# Patient Record
Sex: MALE | Race: WHITE | Employment: UNEMPLOYED | URBAN - METROPOLITAN AREA
[De-identification: names, ages, dates, MRNs, and addresses within clinical notes are randomized per-mention and may not be internally consistent; named-entity substitution may affect disease eponyms.]

---

## 2018-01-06 ENCOUNTER — HOSPITAL ENCOUNTER (EMERGENCY)
Age: 38
Discharge: HOME | End: 2018-01-06
Attending: EMERGENCY MEDICINE
Payer: COMMERCIAL

## 2018-01-06 VITALS
TEMPERATURE: 97.9 F | HEIGHT: 69 IN | OXYGEN SATURATION: 94 % | SYSTOLIC BLOOD PRESSURE: 140 MMHG | DIASTOLIC BLOOD PRESSURE: 84 MMHG | WEIGHT: 218.38 LBS | HEART RATE: 111 BPM | RESPIRATION RATE: 18 BRPM | BODY MASS INDEX: 32.35 KG/M2

## 2018-01-06 DIAGNOSIS — R11.2 NON-INTRACTABLE VOMITING WITH NAUSEA, UNSPECIFIED VOMITING TYPE: Primary | ICD-10-CM

## 2018-01-06 LAB
ABSOLUTE EOS #: 0.1 K/UL (ref 0–0.4)
ABSOLUTE IMMATURE GRANULOCYTE: ABNORMAL K/UL (ref 0–0.3)
ABSOLUTE LYMPH #: 2.4 K/UL (ref 1–4.8)
ABSOLUTE MONO #: 1.1 K/UL (ref 0.2–0.8)
ALBUMIN SERPL-MCNC: 4.4 G/DL (ref 3.5–5.2)
ALBUMIN/GLOBULIN RATIO: NORMAL (ref 1–2.5)
ALP BLD-CCNC: 72 U/L (ref 40–129)
ALT SERPL-CCNC: 17 U/L (ref 5–41)
AMYLASE: 35 U/L (ref 28–100)
ANION GAP SERPL CALCULATED.3IONS-SCNC: 15 MMOL/L (ref 9–17)
AST SERPL-CCNC: 16 U/L
BASOPHILS # BLD: 1 % (ref 0–2)
BASOPHILS ABSOLUTE: 0.1 K/UL (ref 0–0.2)
BILIRUB SERPL-MCNC: 0.48 MG/DL (ref 0.3–1.2)
BILIRUBIN DIRECT: 0.11 MG/DL
BILIRUBIN, INDIRECT: 0.37 MG/DL (ref 0–1)
BUN BLDV-MCNC: 9 MG/DL (ref 6–20)
BUN/CREAT BLD: 13 (ref 9–20)
CALCIUM SERPL-MCNC: 9.5 MG/DL (ref 8.6–10.4)
CHLORIDE BLD-SCNC: 97 MMOL/L (ref 98–107)
CO2: 25 MMOL/L (ref 20–31)
CREAT SERPL-MCNC: 0.67 MG/DL (ref 0.7–1.2)
DIFFERENTIAL TYPE: ABNORMAL
EOSINOPHILS RELATIVE PERCENT: 1 % (ref 1–4)
GFR AFRICAN AMERICAN: >60 ML/MIN
GFR NON-AFRICAN AMERICAN: >60 ML/MIN
GFR SERPL CREATININE-BSD FRML MDRD: ABNORMAL ML/MIN/{1.73_M2}
GFR SERPL CREATININE-BSD FRML MDRD: ABNORMAL ML/MIN/{1.73_M2}
GLOBULIN: NORMAL G/DL (ref 1.5–3.8)
GLUCOSE BLD-MCNC: 130 MG/DL (ref 70–99)
HCT VFR BLD CALC: 43.7 % (ref 41–53)
HEMOGLOBIN: 14.8 G/DL (ref 13.5–17.5)
IMMATURE GRANULOCYTES: ABNORMAL %
LIPASE: 14 U/L (ref 13–60)
LYMPHOCYTES # BLD: 20 % (ref 24–44)
MCH RBC QN AUTO: 28.7 PG (ref 26–34)
MCHC RBC AUTO-ENTMCNC: 34 G/DL (ref 31–37)
MCV RBC AUTO: 84.5 FL (ref 80–100)
MONOCYTES # BLD: 9 % (ref 1–7)
PDW BLD-RTO: 12.9 % (ref 11.5–14.5)
PLATELET # BLD: 332 K/UL (ref 130–400)
PLATELET ESTIMATE: ABNORMAL
PMV BLD AUTO: 7 FL (ref 6–12)
POTASSIUM SERPL-SCNC: 3.8 MMOL/L (ref 3.7–5.3)
RBC # BLD: 5.17 M/UL (ref 4.5–5.9)
RBC # BLD: ABNORMAL 10*6/UL
SEG NEUTROPHILS: 69 % (ref 36–66)
SEGMENTED NEUTROPHILS ABSOLUTE COUNT: 8.4 K/UL (ref 1.8–7.7)
SODIUM BLD-SCNC: 137 MMOL/L (ref 135–144)
TOTAL PROTEIN: 7.7 G/DL (ref 6.4–8.3)
WBC # BLD: 12.2 K/UL (ref 3.5–11)
WBC # BLD: ABNORMAL 10*3/UL

## 2018-01-06 PROCEDURE — 80076 HEPATIC FUNCTION PANEL: CPT

## 2018-01-06 PROCEDURE — 6360000002 HC RX W HCPCS: Performed by: EMERGENCY MEDICINE

## 2018-01-06 PROCEDURE — 82150 ASSAY OF AMYLASE: CPT

## 2018-01-06 PROCEDURE — 80048 BASIC METABOLIC PNL TOTAL CA: CPT

## 2018-01-06 PROCEDURE — 96374 THER/PROPH/DIAG INJ IV PUSH: CPT

## 2018-01-06 PROCEDURE — 85025 COMPLETE CBC W/AUTO DIFF WBC: CPT

## 2018-01-06 PROCEDURE — 81003 URINALYSIS AUTO W/O SCOPE: CPT

## 2018-01-06 PROCEDURE — 99283 EMERGENCY DEPT VISIT LOW MDM: CPT

## 2018-01-06 PROCEDURE — 83690 ASSAY OF LIPASE: CPT

## 2018-01-06 PROCEDURE — 2580000003 HC RX 258: Performed by: EMERGENCY MEDICINE

## 2018-01-06 RX ORDER — TRAZODONE HYDROCHLORIDE 100 MG/1
TABLET ORAL
Refills: 0 | COMMUNITY
Start: 2018-01-03

## 2018-01-06 RX ORDER — ONDANSETRON 4 MG/1
4 TABLET, ORALLY DISINTEGRATING ORAL EVERY 6 HOURS PRN
Qty: 12 TABLET | Refills: 0 | Status: SHIPPED | OUTPATIENT
Start: 2018-01-06

## 2018-01-06 RX ORDER — ATOMOXETINE 40 MG/1
CAPSULE ORAL
Refills: 0 | COMMUNITY
Start: 2018-01-03

## 2018-01-06 RX ORDER — BUPRENORPHINE HYDROCHLORIDE, NALOXONE HYDROCHLORIDE 12; 3 MG/1; MG/1
FILM, SOLUBLE BUCCAL; SUBLINGUAL
COMMUNITY
Start: 2018-01-04

## 2018-01-06 RX ORDER — BUSPIRONE HYDROCHLORIDE 30 MG/1
TABLET ORAL
Refills: 0 | COMMUNITY
Start: 2018-01-03

## 2018-01-06 RX ORDER — ONDANSETRON 2 MG/ML
4 INJECTION INTRAMUSCULAR; INTRAVENOUS ONCE
Status: COMPLETED | OUTPATIENT
Start: 2018-01-06 | End: 2018-01-06

## 2018-01-06 RX ORDER — 0.9 % SODIUM CHLORIDE 0.9 %
1000 INTRAVENOUS SOLUTION INTRAVENOUS ONCE
Status: COMPLETED | OUTPATIENT
Start: 2018-01-06 | End: 2018-01-06

## 2018-01-06 RX ADMIN — ONDANSETRON 4 MG: 2 INJECTION INTRAMUSCULAR; INTRAVENOUS at 08:46

## 2018-01-06 RX ADMIN — SODIUM CHLORIDE 1000 ML: 9 INJECTION, SOLUTION INTRAVENOUS at 08:46

## 2018-01-06 ASSESSMENT — ENCOUNTER SYMPTOMS
COLOR CHANGE: 0
COUGH: 0
EYE DISCHARGE: 0
CONSTIPATION: 0
NAUSEA: 1
EYE REDNESS: 0
VOMITING: 1
ABDOMINAL PAIN: 1
FACIAL SWELLING: 0
SHORTNESS OF BREATH: 0
DIARRHEA: 0

## 2018-01-06 ASSESSMENT — PAIN DESCRIPTION - LOCATION: LOCATION: ABDOMEN

## 2018-01-06 ASSESSMENT — PAIN SCALES - GENERAL: PAINLEVEL_OUTOF10: 6

## 2018-01-06 NOTE — ED NOTES
Patient comes in from home and presents with nausea for one day and vomiting starting 1/5/18. Patient state that he started having nausea three days ago and yesterday was having intermittent vomiting and unable to hold any fluids or food down. Patient is accompanied by mother and is alert and oriented x4. Patient states that he is a recovering IV drug user and has been on a suboxone program for one month.        Vicki Palacio RN  01/06/18 [id number]

## 2018-01-06 NOTE — ED PROVIDER NOTES
[address] [place] [address] ED  eMERGENCY dEPARTMENT eNCOUnter      Pt Name: Nirav Damian  MRN: [id number]  [place] 1980  Date of evaluation: 2018  Provider: Jorge Daily MD    CHIEF COMPLAINT       Chief Complaint   Patient presents with    Abdominal Pain         HISTORY OF PRESENT ILLNESS  (Location/Symptom, Timing/Onset, Context/Setting, Quality, Duration, Modifying Factors, Severity.)   Nirav Damian is a 30 y.o. male who presents to the emergency department For abdominal pain. It started at 9 or 10:00 last night and it's in the right upper quadrant. He has been vomiting and states during the day yesterday he couldn't keep anything down. No fever or trauma but he states he has a history of hepatitis C. He rated the pain as a 6 and it's continuous. No cough or shortness breath. Nursing Notes were reviewed. ALLERGIES     Food; Suxamethonium [succinylcholine]; Molds & smuts; Other; Pollen extract; and Penicillins    CURRENT MEDICATIONS       Previous Medications    ATOMOXETINE (STRATTERA) 40 MG CAPSULE    take 1 capsule by mouth every morning    BUSPIRONE (BUSPAR) 30 MG TABLET    take 1 tablet by mouth three times a day    SUBOXONE 12-3 MG SUBLINGUAL FILM        TRAZODONE (DESYREL) 100 MG TABLET    take 2 tablets by mouth at bedtime       PAST MEDICAL HISTORY         Diagnosis Date    Asthma     as child    Fracture, metacarpal 5/30/15    left     Hand pain, left     Hepatitis C     Heroin abuse     IV    Left eye injury     \"cracked eye socket\"    Opiate addiction ([address]        SURGICAL HISTORY           Procedure Laterality Date    HAND CLOSED REDUCTION Left 2015    perc. pinning.  5th metacarpal    TONSILLECTOMY      TYMPANOSTOMY TUBE PLACEMENT Bilateral     x3         FAMILY HISTORY           Problem Relation Age of Onset    Kidney Disease Sister     Diabetes Brother      Family Status   Relation Status    Mother Alive    Father     Sister Alive    Brother Alive        SOCIAL HISTORY      reports that he has been smoking Cigarettes. He has a 20.00 pack-year smoking history. He has never used smokeless tobacco. He reports that he drinks alcohol. He reports that he uses drugs, including Marijuana, IV, and Opiates . REVIEW OF SYSTEMS    (2-9 systems for level 4, 10 or more for level 5)     Review of Systems   Constitutional: Negative for chills, fatigue and fever. HENT: Negative for congestion, ear discharge and facial swelling. Eyes: Negative for discharge and redness. Respiratory: Negative for cough and shortness of breath. Cardiovascular: Negative for chest pain. Gastrointestinal: Positive for abdominal pain, nausea and vomiting. Negative for constipation and diarrhea. Genitourinary: Negative for dysuria and hematuria. Musculoskeletal: Negative for arthralgias. Skin: Negative for color change and rash. Neurological: Negative for syncope, numbness and headaches. Hematological: Negative for adenopathy. Psychiatric/Behavioral: Negative for confusion. The patient is not nervous/anxious. Except as noted above the remainder of the review of systems was reviewed and negative. PHYSICAL EXAM    (up to 7 for level 4, 8 or more for level 5)     Vitals:    01/06/18 0759   BP: (!) 140/84   Pulse: 111   Resp: 18   Temp: 97.9 °F (36.6 °C)   SpO2: 94%   Weight: 218 lb 6 oz (99.1 kg)   Height: 5' 9\" (1.753 m)       Physical Exam   Constitutional: He is oriented to person, place, and time. He appears well-developed and well-nourished. No distress. HENT:   Head: Normocephalic and atraumatic. Eyes: Right eye exhibits no discharge. Left eye exhibits no discharge. No scleral icterus. Neck: Neck supple. Cardiovascular: Normal rate and regular rhythm. Pulmonary/Chest: Effort normal and breath sounds normal. No stridor. No respiratory distress. He has no wheezes. He has no rales. Abdominal: Soft. He exhibits no distension.  There is

## 2018-02-26 ENCOUNTER — HOSPITAL ENCOUNTER (OUTPATIENT)
Age: 38
Setting detail: SPECIMEN
Discharge: HOME | End: 2018-02-26
Payer: COMMERCIAL

## 2018-02-26 LAB
ABSOLUTE EOS #: 0.1 K/UL (ref 0–0.44)
ABSOLUTE IMMATURE GRANULOCYTE: 0.03 K/UL (ref 0–0.3)
ABSOLUTE LYMPH #: 3.13 K/UL (ref 1.1–3.7)
ABSOLUTE MONO #: 0.59 K/UL (ref 0.1–1.2)
ALBUMIN SERPL-MCNC: 3.9 G/DL (ref 3.5–5.2)
ALBUMIN/GLOBULIN RATIO: 1.3 (ref 1–2.5)
ALP BLD-CCNC: 69 U/L (ref 40–129)
ALT SERPL-CCNC: 29 U/L (ref 5–41)
ANION GAP SERPL CALCULATED.3IONS-SCNC: 18 MMOL/L (ref 9–17)
AST SERPL-CCNC: 20 U/L
BASOPHILS # BLD: 1 % (ref 0–2)
BASOPHILS ABSOLUTE: 0.06 K/UL (ref 0–0.2)
BILIRUB SERPL-MCNC: 0.22 MG/DL (ref 0.3–1.2)
BILIRUBIN DIRECT: <0.08 MG/DL
BILIRUBIN, INDIRECT: ABNORMAL MG/DL (ref 0–1)
BUN BLDV-MCNC: 6 MG/DL (ref 6–20)
CALCIUM SERPL-MCNC: 9.3 MG/DL (ref 8.6–10.4)
CHLORIDE BLD-SCNC: 102 MMOL/L (ref 98–107)
CHOLESTEROL/HDL RATIO: 4.5
CHOLESTEROL: 153 MG/DL
CO2: 24 MMOL/L (ref 20–31)
CREAT SERPL-MCNC: 0.64 MG/DL (ref 0.7–1.2)
DIFFERENTIAL TYPE: NORMAL
EOSINOPHILS RELATIVE PERCENT: 1 % (ref 1–4)
GFR AFRICAN AMERICAN: >60 ML/MIN
GFR NON-AFRICAN AMERICAN: >60 ML/MIN
GFR SERPL CREATININE-BSD FRML MDRD: ABNORMAL ML/MIN/{1.73_M2}
GFR SERPL CREATININE-BSD FRML MDRD: ABNORMAL ML/MIN/{1.73_M2}
GLUCOSE BLD-MCNC: 98 MG/DL (ref 70–99)
HAV IGM SER IA-ACNC: NONREACTIVE
HCT VFR BLD CALC: 44.7 % (ref 40.7–50.3)
HDLC SERPL-MCNC: 34 MG/DL
HEMOGLOBIN: 14.3 G/DL (ref 13–17)
HEPATITIS B CORE IGM ANTIBODY: NONREACTIVE
HEPATITIS B SURFACE ANTIGEN: NONREACTIVE
HEPATITIS C ANTIBODY: REACTIVE
HIV AG/AB: NONREACTIVE
IMMATURE GRANULOCYTES: 0 %
LDL CHOLESTEROL: 89 MG/DL (ref 0–130)
LYMPHOCYTES # BLD: 29 % (ref 24–43)
MCH RBC QN AUTO: 27.8 PG (ref 25.2–33.5)
MCHC RBC AUTO-ENTMCNC: 32 G/DL (ref 28.4–34.8)
MCV RBC AUTO: 86.8 FL (ref 82.6–102.9)
MONOCYTES # BLD: 6 % (ref 3–12)
NRBC AUTOMATED: 0 PER 100 WBC
PDW BLD-RTO: 13.1 % (ref 11.8–14.4)
PLATELET # BLD: 332 K/UL (ref 138–453)
PLATELET ESTIMATE: NORMAL
PMV BLD AUTO: 9.5 FL (ref 8.1–13.5)
POTASSIUM SERPL-SCNC: 3.8 MMOL/L (ref 3.7–5.3)
RBC # BLD: 5.15 M/UL (ref 4.21–5.77)
RBC # BLD: NORMAL 10*6/UL
SEG NEUTROPHILS: 63 % (ref 36–65)
SEGMENTED NEUTROPHILS ABSOLUTE COUNT: 6.84 K/UL (ref 1.5–8.1)
SODIUM BLD-SCNC: 144 MMOL/L (ref 135–144)
T3 FREE: 3.62 PG/ML (ref 2.02–4.43)
THYROXINE, FREE: 1.43 NG/DL (ref 0.93–1.7)
TOTAL PROTEIN: 6.9 G/DL (ref 6.4–8.3)
TRIGL SERPL-MCNC: 151 MG/DL
TSH SERPL DL<=0.05 MIU/L-ACNC: 0.16 MIU/L (ref 0.3–5)
VLDLC SERPL CALC-MCNC: ABNORMAL MG/DL (ref 1–30)
WBC # BLD: 10.8 K/UL (ref 3.5–11.3)
WBC # BLD: NORMAL 10*3/UL

## 2018-02-28 LAB
QUANTIFERON (R) TB GOLD (INCUBATED): NEGATIVE
QUANTIFERON MITOGEN: >10 IU/ML
QUANTIFERON NIL: 0.21 IU/ML
QUANTIFERON TB AG MINUS NIL: 0.02 IU/ML (ref 0–0.34)

## 2018-09-17 ENCOUNTER — HOSPITAL ENCOUNTER (EMERGENCY)
Age: 38
Discharge: HOME | End: 2018-09-17
Attending: EMERGENCY MEDICINE
Payer: COMMERCIAL

## 2018-09-17 ENCOUNTER — HOSPITAL ENCOUNTER (OUTPATIENT)
Age: 38
Discharge: HOME | End: 2018-09-17
Payer: COMMERCIAL

## 2018-09-17 VITALS
SYSTOLIC BLOOD PRESSURE: 138 MMHG | WEIGHT: 230.9 LBS | RESPIRATION RATE: 16 BRPM | DIASTOLIC BLOOD PRESSURE: 86 MMHG | HEART RATE: 82 BPM | HEIGHT: 69 IN | TEMPERATURE: 98.8 F | OXYGEN SATURATION: 97 % | BODY MASS INDEX: 34.2 KG/M2

## 2018-09-17 DIAGNOSIS — I80.02 THROMBOPHLEBITIS OF SUPERFICIAL VEINS OF LEFT LOWER EXTREMITY: Primary | ICD-10-CM

## 2018-09-17 LAB
ABSOLUTE EOS #: 0.3 K/UL (ref 0–0.4)
ABSOLUTE IMMATURE GRANULOCYTE: ABNORMAL K/UL (ref 0–0.3)
ABSOLUTE LYMPH #: 3 K/UL (ref 1–4.8)
ABSOLUTE MONO #: 0.6 K/UL (ref 0.2–0.8)
ALBUMIN SERPL-MCNC: 4.3 G/DL (ref 3.5–5.2)
ALBUMIN/GLOBULIN RATIO: ABNORMAL (ref 1–2.5)
ALP BLD-CCNC: 59 U/L (ref 40–129)
ALT SERPL-CCNC: 23 U/L (ref 5–41)
ANION GAP SERPL CALCULATED.3IONS-SCNC: 10 MMOL/L (ref 9–17)
AST SERPL-CCNC: 22 U/L
BASOPHILS # BLD: 1 % (ref 0–2)
BASOPHILS ABSOLUTE: 0.1 K/UL (ref 0–0.2)
BILIRUB SERPL-MCNC: 0.2 MG/DL (ref 0.3–1.2)
BILIRUBIN DIRECT: <0.08 MG/DL
BILIRUBIN, INDIRECT: ABNORMAL MG/DL (ref 0–1)
BUN BLDV-MCNC: 4 MG/DL (ref 6–20)
BUN/CREAT BLD: 5 (ref 9–20)
CALCIUM SERPL-MCNC: 9 MG/DL (ref 8.6–10.4)
CHLORIDE BLD-SCNC: 103 MMOL/L (ref 98–107)
CO2: 29 MMOL/L (ref 20–31)
CREAT SERPL-MCNC: 0.77 MG/DL (ref 0.7–1.2)
D-DIMER QUANTITATIVE: 0.81 MG/L FEU
DIFFERENTIAL TYPE: ABNORMAL
EOSINOPHILS RELATIVE PERCENT: 3 % (ref 1–4)
GFR AFRICAN AMERICAN: >60 ML/MIN
GFR NON-AFRICAN AMERICAN: >60 ML/MIN
GFR SERPL CREATININE-BSD FRML MDRD: ABNORMAL ML/MIN/{1.73_M2}
GFR SERPL CREATININE-BSD FRML MDRD: ABNORMAL ML/MIN/{1.73_M2}
GGT: 42 U/L (ref 8–61)
GLOBULIN: ABNORMAL G/DL (ref 1.5–3.8)
GLUCOSE BLD-MCNC: 108 MG/DL (ref 70–99)
HCT VFR BLD CALC: 40 % (ref 41–53)
HEMOGLOBIN: 13.4 G/DL (ref 13.5–17.5)
HIV AG/AB: NONREACTIVE
IMMATURE GRANULOCYTES: ABNORMAL %
INR BLD: 1
LYMPHOCYTES # BLD: 32 % (ref 24–44)
MCH RBC QN AUTO: 28.6 PG (ref 26–34)
MCHC RBC AUTO-ENTMCNC: 33.4 G/DL (ref 31–37)
MCV RBC AUTO: 85.7 FL (ref 80–100)
MONOCYTES # BLD: 7 % (ref 1–7)
NRBC AUTOMATED: ABNORMAL PER 100 WBC
PARTIAL THROMBOPLASTIN TIME: 28.1 SEC (ref 23–31)
PDW BLD-RTO: 14.4 % (ref 11.5–14.5)
PLATELET # BLD: 285 K/UL (ref 130–400)
PLATELET ESTIMATE: ABNORMAL
PMV BLD AUTO: 6.8 FL (ref 6–12)
POTASSIUM SERPL-SCNC: 3.7 MMOL/L (ref 3.7–5.3)
PROTHROMBIN TIME: 10 SEC (ref 9.7–11.6)
RBC # BLD: 4.66 M/UL (ref 4.5–5.9)
RBC # BLD: ABNORMAL 10*6/UL
SEG NEUTROPHILS: 57 % (ref 36–66)
SEGMENTED NEUTROPHILS ABSOLUTE COUNT: 5.3 K/UL (ref 1.8–7.7)
SODIUM BLD-SCNC: 142 MMOL/L (ref 135–144)
T. PALLIDUM, IGG: NONREACTIVE
TOTAL PROTEIN: 6.7 G/DL (ref 6.4–8.3)
WBC # BLD: 9.3 K/UL (ref 3.5–11)
WBC # BLD: ABNORMAL 10*3/UL

## 2018-09-17 PROCEDURE — 85730 THROMBOPLASTIN TIME PARTIAL: CPT

## 2018-09-17 PROCEDURE — 85610 PROTHROMBIN TIME: CPT

## 2018-09-17 PROCEDURE — 85025 COMPLETE CBC W/AUTO DIFF WBC: CPT

## 2018-09-17 PROCEDURE — 82977 ASSAY OF GGT: CPT

## 2018-09-17 PROCEDURE — 80074 ACUTE HEPATITIS PANEL: CPT

## 2018-09-17 PROCEDURE — 36415 COLL VENOUS BLD VENIPUNCTURE: CPT

## 2018-09-17 PROCEDURE — 86780 TREPONEMA PALLIDUM: CPT

## 2018-09-17 PROCEDURE — 85379 FIBRIN DEGRADATION QUANT: CPT

## 2018-09-17 PROCEDURE — 80048 BASIC METABOLIC PNL TOTAL CA: CPT

## 2018-09-17 PROCEDURE — 87389 HIV-1 AG W/HIV-1&-2 AB AG IA: CPT

## 2018-09-17 PROCEDURE — 99283 EMERGENCY DEPT VISIT LOW MDM: CPT

## 2018-09-17 PROCEDURE — 80076 HEPATIC FUNCTION PANEL: CPT

## 2018-09-17 PROCEDURE — 6370000000 HC RX 637 (ALT 250 FOR IP): Performed by: NURSE PRACTITIONER

## 2018-09-17 PROCEDURE — 93971 EXTREMITY STUDY: CPT

## 2018-09-17 RX ORDER — NAPROXEN 500 MG/1
500 TABLET ORAL 2 TIMES DAILY
Qty: 20 TABLET | Refills: 0 | Status: SHIPPED | OUTPATIENT
Start: 2018-09-17 | End: 2018-09-27

## 2018-09-17 RX ORDER — GABAPENTIN 300 MG/1
400 CAPSULE ORAL 3 TIMES DAILY
COMMUNITY

## 2018-09-17 RX ORDER — IBUPROFEN 800 MG/1
800 TABLET ORAL ONCE
Status: COMPLETED | OUTPATIENT
Start: 2018-09-17 | End: 2018-09-17

## 2018-09-17 RX ADMIN — IBUPROFEN 800 MG: 800 TABLET ORAL at 14:27

## 2018-09-17 ASSESSMENT — ENCOUNTER SYMPTOMS
CHEST TIGHTNESS: 0
COLOR CHANGE: 1
VOMITING: 0
NAUSEA: 0

## 2018-09-17 ASSESSMENT — PAIN DESCRIPTION - ORIENTATION: ORIENTATION: LEFT

## 2018-09-17 ASSESSMENT — PAIN DESCRIPTION - LOCATION: LOCATION: LEG

## 2018-09-17 ASSESSMENT — PAIN DESCRIPTION - FREQUENCY: FREQUENCY: CONTINUOUS

## 2018-09-17 ASSESSMENT — PAIN SCALES - GENERAL
PAINLEVEL_OUTOF10: 6
PAINLEVEL_OUTOF10: 6

## 2018-09-17 ASSESSMENT — PAIN DESCRIPTION - DESCRIPTORS: DESCRIPTORS: SHOOTING;THROBBING;CONSTANT

## 2018-09-17 ASSESSMENT — PAIN SCALES - WONG BAKER: WONGBAKER_NUMERICALRESPONSE: 6

## 2018-09-17 NOTE — ED PROVIDER NOTES
[facility] ED  eMERGENCY dEPARTMENT eNCOUnter      Pt Name: Janet Mahmood  MRN: [id number]  [place] 1980  Date of evaluation: 9/17/2018  Provider: Jacinta New       Chief Complaint   Patient presents with    Leg Pain     left leg, redness behind left knee, onset last Wed         HISTORY OF PRESENT ILLNESS  (Location/Symptom, Timing/Onset, Context/Setting, Quality, Duration, Modifying Factors, Severity.)   Janet Mahmood is a 41 y.o. male who presents to the emergency department via private auto with redness to the left leg that started 5 days ago without injury. He rates the pain at a 6 and describes it as throbbing. Venous constant without aggravating or alleviating factors. No drainage or fevers. No leg swelling, calf pain, chest pain or shortness of breath. No history of clotting disorders. Nursing Notes were reviewed. ALLERGIES     Food; Suxamethonium [succinylcholine]; Molds & smuts; Other; Pollen extract; and Penicillins    CURRENT MEDICATIONS       Previous Medications    ATOMOXETINE (STRATTERA) 40 MG CAPSULE    take 1 capsule by mouth every morning    BUPROPION HCL (WELLBUTRIN PO)    Take by mouth daily    BUSPIRONE (BUSPAR) 30 MG TABLET    take 1 tablet by mouth three times a day    GABAPENTIN (NEURONTIN) 300 MG CAPSULE    Take 600 mg by mouth 2 times daily. Tonia Lin ONDANSETRON (ZOFRAN ODT) 4 MG DISINTEGRATING TABLET    Take 1 tablet by mouth every 6 hours as needed for Nausea or Vomiting    SUBOXONE 12-3 MG SUBLINGUAL FILM        TRAZODONE (DESYREL) 100 MG TABLET    take 2 tablets by mouth at bedtime       PAST MEDICAL HISTORY         Diagnosis Date    Asthma     as child    Fracture, metacarpal 5/30/15    left     Hand pain, left     Hepatitis C     Heroin abuse     IV    Left eye injury 2009    \"cracked eye socket\"    Opiate addiction ([facility] [address]        SURGICAL HISTORY           Procedure Laterality Date    HAND CLOSED REDUCTION Left 6/16/2015    perc. dictations but occasionally words are mis-transcribed. )    LAILA BARRY, APRN - HARSHAD Hickman, IVON - HARSHAD  09/17/18 [id number]

## 2018-09-18 LAB
HAV IGM SER IA-ACNC: ABNORMAL
HEPATITIS B CORE IGM ANTIBODY: NONREACTIVE
HEPATITIS B SURFACE ANTIGEN: NONREACTIVE
HEPATITIS C ANTIBODY: REACTIVE

## 2018-09-20 LAB
SEND OUT REPORT: NORMAL
TEST NAME: NORMAL

## 2018-12-01 ENCOUNTER — HOSPITAL ENCOUNTER (EMERGENCY)
Age: 38
Discharge: HOME | End: 2018-12-01
Payer: COMMERCIAL

## 2018-12-01 VITALS
WEIGHT: 240 LBS | BODY MASS INDEX: 35.55 KG/M2 | TEMPERATURE: 98.4 F | DIASTOLIC BLOOD PRESSURE: 81 MMHG | OXYGEN SATURATION: 97 % | HEIGHT: 69 IN | RESPIRATION RATE: 16 BRPM | HEART RATE: 91 BPM | SYSTOLIC BLOOD PRESSURE: 135 MMHG

## 2018-12-01 DIAGNOSIS — J00 NASOPHARYNGITIS ACUTE: Primary | ICD-10-CM

## 2018-12-01 LAB
DIRECT EXAM: NORMAL
Lab: NORMAL
SPECIMEN DESCRIPTION: NORMAL
STATUS: NORMAL

## 2018-12-01 PROCEDURE — 87804 INFLUENZA ASSAY W/OPTIC: CPT

## 2018-12-01 PROCEDURE — 99283 EMERGENCY DEPT VISIT LOW MDM: CPT

## 2018-12-01 RX ORDER — BUPRENORPHINE AND NALOXONE 8; 2 MG/1; MG/1
1 FILM, SOLUBLE BUCCAL; SUBLINGUAL 2 TIMES DAILY
COMMUNITY

## 2018-12-01 RX ORDER — FEXOFENADINE HCL AND PSEUDOEPHEDRINE HCI 60; 120 MG/1; MG/1
1 TABLET, EXTENDED RELEASE ORAL 2 TIMES DAILY
Qty: 24 TABLET | Refills: 0 | Status: SHIPPED | OUTPATIENT
Start: 2018-12-01

## 2018-12-01 ASSESSMENT — PAIN DESCRIPTION - LOCATION: LOCATION: GENERALIZED

## 2018-12-01 ASSESSMENT — PAIN SCALES - WONG BAKER: WONGBAKER_NUMERICALRESPONSE: 6

## 2018-12-01 ASSESSMENT — PAIN DESCRIPTION - DESCRIPTORS: DESCRIPTORS: ACHING

## 2018-12-01 ASSESSMENT — PAIN SCALES - GENERAL: PAINLEVEL_OUTOF10: 6

## 2018-12-01 ASSESSMENT — PAIN DESCRIPTION - FREQUENCY: FREQUENCY: INTERMITTENT

## 2018-12-02 ASSESSMENT — ENCOUNTER SYMPTOMS
SORE THROAT: 1
DIARRHEA: 0
COLOR CHANGE: 0
EYE PAIN: 0
BACK PAIN: 0
COUGH: 1
SHORTNESS OF BREATH: 0
CONSTIPATION: 0
ABDOMINAL PAIN: 0
SINUS PAIN: 1
NAUSEA: 0
WHEEZING: 0
VOMITING: 0

## 2018-12-02 NOTE — ED PROVIDER NOTES
[address] [place] [address] ED  eMERGENCY dEPARTMENT eNCOUnter      Pt Name: Shamika Hallman  MRN: [id number]  [place] 1980  Date of evaluation: 12/1/2018  Provider: IVON Florez [id number]       Chief Complaint   Patient presents with    Cough     green productive, treated with keflex 2 weeks    Congestion         HISTORY OF PRESENT ILLNESS  (Location/Symptom, Timing/Onset, Context/Setting, Quality, Duration, Modifying Factors, Severity.)   Shamika Hallman is a 41 y.o. male who presents to the emergency department  Who presents with stuffy runny nose, sore throat, Myalgias and cough. Symptoms 2 days ago. He has taken nothing for the symptoms. He denies any fever, chills. He feels he needs to have antibiotics for the symptoms. He is in no acute distress. He drove himself to the hospital.      Nursing Notes were reviewed. ALLERGIES     Food; Suxamethonium [succinylcholine]; Molds & smuts; Other; Pollen extract; and Penicillins    CURRENT MEDICATIONS       Previous Medications    ATOMOXETINE (STRATTERA) 40 MG CAPSULE    100mg every am    BUPRENORPHINE-NALOXONE (SUBOXONE) 8-2 MG FILM SL FILM    Place 1 Film under the tongue 2 times daily. .    BUPROPION HCL (WELLBUTRIN PO)    Take by mouth daily    BUSPIRONE (BUSPAR) 30 MG TABLET    take 1 tablet by mouth three times a day    GABAPENTIN (NEURONTIN) 300 MG CAPSULE    Take 400 mg by mouth 3 times daily. Ebony Norse     NAPROXEN (NAPROSYN) 500 MG TABLET    Take 1 tablet by mouth 2 times daily for 10 days    ONDANSETRON (ZOFRAN ODT) 4 MG DISINTEGRATING TABLET    Take 1 tablet by mouth every 6 hours as needed for Nausea or Vomiting    SUBOXONE 12-3 MG SUBLINGUAL FILM        TRAZODONE (DESYREL) 100 MG TABLET    take 2 tablets by mouth at bedtime       PAST MEDICAL HISTORY         Diagnosis Date    Asthma     as child    Fracture, metacarpal 5/30/15    left     Hand pain, left     Hepatitis C     Heroin abuse ([facility] [address]     IV    Left eye injury 2009 provider. CONSULTS:  None    PROCEDURES:  None    FINAL IMPRESSION      1.  Nasopharyngitis acute          DISPOSITION/PLAN   DISPOSITION Decision To Discharge 12/01/2018 08:34:29 PM      PATIENT REFERRED TO:   Call [phone] to get help in finding a primary care provider          [facility] ED  [address] [facility]  [phone]  Go to   As needed, If symptoms worsen      DISCHARGE MEDICATIONS:     New Prescriptions    FEXOFENADINE-PSEUDOEPHEDRINE (ALLEGRA-D 12HR)  MG PER EXTENDED RELEASE TABLET    Take 1 tablet by mouth 2 times daily           (Please note that portions of this note were completed with a voice recognition program.  Efforts were made to edit the dictations but occasionally words are mis-transcribed.)    IVON Reyes CNP  Certified Nurse Practitioner            IVON Reyes CNP  12/02/18 4483

## 2019-03-03 ENCOUNTER — HOSPITAL ENCOUNTER (EMERGENCY)
Age: 39
Discharge: HOME | End: 2019-03-04
Attending: EMERGENCY MEDICINE
Payer: COMMERCIAL

## 2019-03-03 VITALS
OXYGEN SATURATION: 95 % | RESPIRATION RATE: 16 BRPM | BODY MASS INDEX: 36.78 KG/M2 | TEMPERATURE: 98 F | HEIGHT: 69 IN | HEART RATE: 99 BPM | SYSTOLIC BLOOD PRESSURE: 137 MMHG | WEIGHT: 248.3 LBS | DIASTOLIC BLOOD PRESSURE: 89 MMHG

## 2019-03-03 DIAGNOSIS — R60.9 EDEMA, UNSPECIFIED TYPE: ICD-10-CM

## 2019-03-03 DIAGNOSIS — L03.119 CELLULITIS OF LOWER EXTREMITY, UNSPECIFIED LATERALITY: Primary | ICD-10-CM

## 2019-03-03 LAB
ABSOLUTE EOS #: 0.5 K/UL (ref 0–0.4)
ABSOLUTE IMMATURE GRANULOCYTE: ABNORMAL K/UL (ref 0–0.3)
ABSOLUTE LYMPH #: 2.7 K/UL (ref 1–4.8)
ABSOLUTE MONO #: 1.1 K/UL (ref 0.2–0.8)
ANION GAP SERPL CALCULATED.3IONS-SCNC: 14 MMOL/L (ref 9–17)
BASOPHILS # BLD: 1 % (ref 0–2)
BASOPHILS ABSOLUTE: 0.1 K/UL (ref 0–0.2)
BNP INTERPRETATION: NORMAL
BUN BLDV-MCNC: 9 MG/DL (ref 6–20)
BUN/CREAT BLD: 10 (ref 9–20)
CALCIUM SERPL-MCNC: 8.8 MG/DL (ref 8.6–10.4)
CHLORIDE BLD-SCNC: 101 MMOL/L (ref 98–107)
CO2: 24 MMOL/L (ref 20–31)
CREAT SERPL-MCNC: 0.93 MG/DL (ref 0.7–1.2)
DIFFERENTIAL TYPE: ABNORMAL
EOSINOPHILS RELATIVE PERCENT: 4 % (ref 1–4)
GFR AFRICAN AMERICAN: >60 ML/MIN
GFR NON-AFRICAN AMERICAN: >60 ML/MIN
GFR SERPL CREATININE-BSD FRML MDRD: ABNORMAL ML/MIN/{1.73_M2}
GFR SERPL CREATININE-BSD FRML MDRD: ABNORMAL ML/MIN/{1.73_M2}
GLUCOSE BLD-MCNC: 96 MG/DL (ref 70–99)
HCT VFR BLD CALC: 39.6 % (ref 41–53)
HEMOGLOBIN: 13.4 G/DL (ref 13.5–17.5)
IMMATURE GRANULOCYTES: ABNORMAL %
LYMPHOCYTES # BLD: 23 % (ref 24–44)
MCH RBC QN AUTO: 28.6 PG (ref 26–34)
MCHC RBC AUTO-ENTMCNC: 33.8 G/DL (ref 31–37)
MCV RBC AUTO: 84.8 FL (ref 80–100)
MONOCYTES # BLD: 9 % (ref 1–7)
NRBC AUTOMATED: ABNORMAL PER 100 WBC
PDW BLD-RTO: 13.2 % (ref 11.5–14.5)
PLATELET # BLD: 320 K/UL (ref 130–400)
PLATELET ESTIMATE: ABNORMAL
PMV BLD AUTO: 7.5 FL (ref 6–12)
POTASSIUM SERPL-SCNC: 3.6 MMOL/L (ref 3.7–5.3)
PRO-BNP: 32 PG/ML
RBC # BLD: 4.67 M/UL (ref 4.5–5.9)
RBC # BLD: ABNORMAL 10*6/UL
SEG NEUTROPHILS: 63 % (ref 36–66)
SEGMENTED NEUTROPHILS ABSOLUTE COUNT: 7.3 K/UL (ref 1.8–7.7)
SODIUM BLD-SCNC: 139 MMOL/L (ref 135–144)
WBC # BLD: 11.7 K/UL (ref 3.5–11)
WBC # BLD: ABNORMAL 10*3/UL

## 2019-03-03 PROCEDURE — 83880 ASSAY OF NATRIURETIC PEPTIDE: CPT

## 2019-03-03 PROCEDURE — 85025 COMPLETE CBC W/AUTO DIFF WBC: CPT

## 2019-03-03 PROCEDURE — 99283 EMERGENCY DEPT VISIT LOW MDM: CPT

## 2019-03-03 PROCEDURE — 80048 BASIC METABOLIC PNL TOTAL CA: CPT

## 2019-03-03 RX ORDER — FUROSEMIDE 20 MG/1
20 TABLET ORAL DAILY
Qty: 5 TABLET | Refills: 0 | Status: SHIPPED | OUTPATIENT
Start: 2019-03-03 | End: 2019-03-08

## 2019-03-03 RX ORDER — CEPHALEXIN 500 MG/1
500 CAPSULE ORAL 2 TIMES DAILY
Qty: 14 CAPSULE | Refills: 0 | Status: SHIPPED | OUTPATIENT
Start: 2019-03-03

## 2019-03-03 ASSESSMENT — PAIN DESCRIPTION - FREQUENCY: FREQUENCY: CONTINUOUS

## 2019-03-03 ASSESSMENT — ENCOUNTER SYMPTOMS
WHEEZING: 0
RHINORRHEA: 0
COUGH: 0
NAUSEA: 0
CONSTIPATION: 0
SINUS PRESSURE: 0
SORE THROAT: 0
DIARRHEA: 0
COLOR CHANGE: 0
ABDOMINAL PAIN: 0
VOMITING: 0
SHORTNESS OF BREATH: 0

## 2019-03-03 ASSESSMENT — PAIN SCALES - GENERAL: PAINLEVEL_OUTOF10: 6

## 2019-03-03 ASSESSMENT — PAIN DESCRIPTION - DESCRIPTORS: DESCRIPTORS: DULL;THROBBING

## 2019-03-03 ASSESSMENT — PAIN DESCRIPTION - ONSET: ONSET: ON-GOING

## 2019-03-03 ASSESSMENT — PAIN DESCRIPTION - PAIN TYPE: TYPE: ACUTE PAIN

## 2019-03-03 ASSESSMENT — PAIN DESCRIPTION - PROGRESSION: CLINICAL_PROGRESSION: NOT CHANGED

## 2019-03-03 ASSESSMENT — PAIN DESCRIPTION - LOCATION: LOCATION: ANKLE;FOOT

## 2019-03-03 ASSESSMENT — PAIN DESCRIPTION - ORIENTATION: ORIENTATION: RIGHT;LEFT
